# Patient Record
Sex: FEMALE | Race: WHITE | NOT HISPANIC OR LATINO | Employment: OTHER | URBAN - NONMETROPOLITAN AREA
[De-identification: names, ages, dates, MRNs, and addresses within clinical notes are randomized per-mention and may not be internally consistent; named-entity substitution may affect disease eponyms.]

---

## 2024-03-24 ENCOUNTER — HOSPITAL ENCOUNTER (EMERGENCY)
Facility: HOSPITAL | Age: 44
Discharge: HOME | End: 2024-03-24
Attending: EMERGENCY MEDICINE

## 2024-03-24 VITALS
HEIGHT: 64 IN | HEART RATE: 86 BPM | SYSTOLIC BLOOD PRESSURE: 106 MMHG | BODY MASS INDEX: 26.98 KG/M2 | OXYGEN SATURATION: 99 % | DIASTOLIC BLOOD PRESSURE: 69 MMHG | RESPIRATION RATE: 20 BRPM | TEMPERATURE: 99 F | WEIGHT: 158 LBS

## 2024-03-24 DIAGNOSIS — L23.7 CONTACT DERMATITIS DUE TO POISON SUMAC: Primary | ICD-10-CM

## 2024-03-24 PROCEDURE — 63600175 PHARM REV CODE 636 W HCPCS: Performed by: EMERGENCY MEDICINE

## 2024-03-24 PROCEDURE — 99283 EMERGENCY DEPT VISIT LOW MDM: CPT

## 2024-03-24 RX ORDER — PREDNISONE 20 MG/1
60 TABLET ORAL
Status: COMPLETED | OUTPATIENT
Start: 2024-03-24 | End: 2024-03-24

## 2024-03-24 RX ORDER — CETIRIZINE HYDROCHLORIDE 10 MG/1
10 TABLET ORAL 2 TIMES DAILY PRN
Qty: 30 TABLET | Refills: 0 | Status: SHIPPED | OUTPATIENT
Start: 2024-03-24 | End: 2024-04-23

## 2024-03-24 RX ORDER — PREDNISONE 10 MG/1
TABLET ORAL
Qty: 80 TABLET | Refills: 0 | Status: SHIPPED | OUTPATIENT
Start: 2024-03-24 | End: 2024-04-13

## 2024-03-24 RX ADMIN — PREDNISONE 60 MG: 20 TABLET ORAL at 09:03

## 2024-03-25 NOTE — ED PROVIDER NOTES
EMERGENCY DEPARTMENT HISTORY AND PHYSICAL EXAM     This note is dictated on M*Modal word recognition program.  There are word recognition mistakes and grammatical errors that are occasionally missed on review.     Date: 3/24/2024   Patient Name: Darrius Ervin       History of Presenting Illness      Chief Complaint   Patient presents with    Rash     Pt to the ER w/ complaints of rash to her entire body x 3 days, began after contact w/ poison sumac. Pt reports worsening rash and itching despite using calamine and benadryl at home.            Darrius Ervin is a 44 y.o. female with PMHX of denies significant history who presents to the emergency department C/O rash.    Patient reports itchy rash across body.  Was exposed to poison sumac 3 days ago.  Minimal relief with calamine lotion and Benadryl      PCP: No, Primary Doctor        Current Facility-Administered Medications   Medication Dose Route Frequency Provider Last Rate Last Admin    predniSONE tablet 60 mg  60 mg Oral ED 1 Time Anderson Castro MD         Current Outpatient Medications   Medication Sig Dispense Refill    amoxicillin-clavulanate 875-125mg (AUGMENTIN) 875-125 mg per tablet Take 1 tablet by mouth 2 (two) times daily. 14 tablet 0    cetirizine (ZYRTEC) 10 MG tablet Take 1 tablet (10 mg total) by mouth 2 (two) times daily as needed for Allergies or Rhinitis. 30 tablet 0    HYDROcodone-acetaminophen (NORCO) 5-325 mg per tablet Take 1 tablet by mouth every 8 (eight) hours as needed for Pain. 10 tablet 0    ibuprofen (ADVIL,MOTRIN) 800 MG tablet Take 1 tablet (800 mg total) by mouth every 6 (six) hours as needed for Pain. 20 tablet 0    predniSONE (DELTASONE) 10 MG tablet Take 6 tablets (60 mg total) by mouth once daily for 4 days, THEN 5 tablets (50 mg total) once daily for 4 days, THEN 4 tablets (40 mg total) once daily for 4 days, THEN 3 tablets (30 mg total) once daily for 4 days, THEN 2 tablets (20 mg total) once daily for 4 days. 80  "tablet 0           Past History     Past Medical History:   History reviewed. No pertinent past medical history.     Past Surgical History:   Past Surgical History:   Procedure Laterality Date    ABLATION      TENDON REPAIR      TUBAL LIGATION          Family History:   No family history on file.     Social History:   Social History     Tobacco Use    Smoking status: Every Day     Current packs/day: 0.75     Types: Cigarettes    Smokeless tobacco: Never   Substance Use Topics    Alcohol use: Yes     Comment: socially    Drug use: Not Currently        Allergies:   Review of patient's allergies indicates:  No Known Allergies       Review of Systems   Review of Systems   See HPI for pertinent positives and negatives       Physical Exam     Vitals:    03/24/24 2118   BP: 106/69   Pulse: 86   Resp: 20   Temp: 98.5 °F (36.9 °C)   TempSrc: Oral   SpO2: 99%   Weight: 71.7 kg (158 lb)   Height: 5' 4" (1.626 m)      Physical Exam  Vitals and nursing note reviewed.   Constitutional:       General: She is not in acute distress.     Appearance: Normal appearance. She is not ill-appearing.   HENT:      Head: Normocephalic and atraumatic.      Right Ear: External ear normal.      Left Ear: External ear normal.      Nose: Nose normal. No congestion or rhinorrhea.      Mouth/Throat:      Mouth: Mucous membranes are moist.   Eyes:      Conjunctiva/sclera: Conjunctivae normal.      Pupils: Pupils are equal, round, and reactive to light.   Pulmonary:      Effort: Pulmonary effort is normal. No respiratory distress.   Musculoskeletal:         General: No deformity. Normal range of motion.      Cervical back: Normal range of motion. No rigidity.   Skin:     Findings: Rash present.      Comments: Splotchy dry macular papular rash involving multiple areas of body including forehead, extremities, torso   Neurological:      General: No focal deficit present.      Mental Status: She is alert and oriented to person, place, and time. Mental " status is at baseline.   Psychiatric:         Mood and Affect: Mood normal.         Behavior: Behavior normal.              Diagnostic Study Results      Labs -   No results found for this or any previous visit (from the past 12 hour(s)).     Radiologic Studies -    No orders to display        Medications given in the ED-   Medications   predniSONE tablet 60 mg (has no administration in time range)           Medical Decision Making    I am the first provider for this patient.     I reviewed the vital signs, available nursing notes, past medical history, past surgical history, family history and social history.     Vital Signs:  Reviewed the patient's vital signs.     Pulse Oximetry Analysis and Interpretation:    99% on Room Air, normal      External Test Results (Pertinent to encounter):    Records Reviewed: Nursing Notes and Current Prescription Medications    History Obtained By: Patient    Provider Notes: Darrius Ervin is a 44 y.o. female with rash    Co-morbidities Considered:  None    Differential Diagnosis:  Topical dermatitis      ED Course:    Discussed with patient management will start on steroid taper for topical dermatitis         Problems Addressed:  Dermatitis    Procedures:   Procedures       Diagnosis and Disposition     Critical Care:      DISCHARGE NOTE:       Darrius Ervin's  results have been reviewed with her.  She has been counseled regarding her diagnosis, treatment, and plan.  She verbally conveys understanding and agreement of the signs, symptoms, diagnosis, treatment and prognosis and additionally agrees to follow up as discussed.  She also agrees with the care-plan and conveys that all of her questions have been answered.  I have also provided discharge instructions for her that include: educational information regarding their diagnosis and treatment, and list of reasons why they would want to return to the ED prior to their follow-up appointment, should her condition change. She  has been provided with education for proper emergency department utilization.         CLINICAL IMPRESSION:         1. Contact dermatitis due to poison sumac              PLAN:   1. Discharge Home  2.      Medication List        START taking these medications      cetirizine 10 MG tablet  Commonly known as: ZYRTEC  Take 1 tablet (10 mg total) by mouth 2 (two) times daily as needed for Allergies or Rhinitis.     predniSONE 10 MG tablet  Commonly known as: DELTASONE  Take 6 tablets (60 mg total) by mouth once daily for 4 days, THEN 5 tablets (50 mg total) once daily for 4 days, THEN 4 tablets (40 mg total) once daily for 4 days, THEN 3 tablets (30 mg total) once daily for 4 days, THEN 2 tablets (20 mg total) once daily for 4 days.  Start taking on: March 24, 2024            ASK your doctor about these medications      amoxicillin-clavulanate 875-125mg 875-125 mg per tablet  Commonly known as: AUGMENTIN  Take 1 tablet by mouth 2 (two) times daily.     HYDROcodone-acetaminophen 5-325 mg per tablet  Commonly known as: NORCO  Take 1 tablet by mouth every 8 (eight) hours as needed for Pain.     ibuprofen 800 MG tablet  Commonly known as: ADVIL,MOTRIN  Take 1 tablet (800 mg total) by mouth every 6 (six) hours as needed for Pain.               Where to Get Your Medications        [id number] - [address] AT [address] [address]      Phone: [phone]   cetirizine 10 MG tablet  predniSONE 10 MG tablet        3. Your PCP    Schedule an appointment as soon as possible for a visit   Primary care follow up       _______________________________     Please note that this dictation was completed with [id number], the computer voice recognition software.  Quite often unanticipated grammatical, syntax, homophones, and other interpretive errors are inadvertently transcribed by the computer software.  Please disregard  these errors.  Please excuse any errors that have escaped final proofreading.             Anderson Castro MD  03/25/24 0125